# Patient Record
Sex: MALE | Race: WHITE | Employment: FULL TIME | URBAN - METROPOLITAN AREA
[De-identification: names, ages, dates, MRNs, and addresses within clinical notes are randomized per-mention and may not be internally consistent; named-entity substitution may affect disease eponyms.]

---

## 2020-11-24 ENCOUNTER — OFFICE VISIT (OUTPATIENT)
Dept: PRIMARY CARE CLINIC | Age: 30
End: 2020-11-24
Payer: COMMERCIAL

## 2020-11-24 VITALS — TEMPERATURE: 97.1 F | HEART RATE: 78 BPM | OXYGEN SATURATION: 98 %

## 2020-11-24 DIAGNOSIS — Z20.822 EXPOSURE TO 2019 NOVEL CORONAVIRUS: ICD-10-CM

## 2020-11-24 DIAGNOSIS — Z13.6 SCREENING FOR CARDIOVASCULAR, RESPIRATORY, AND GENITOURINARY DISEASES: Primary | ICD-10-CM

## 2020-11-24 DIAGNOSIS — Z13.83 SCREENING FOR CARDIOVASCULAR, RESPIRATORY, AND GENITOURINARY DISEASES: Primary | ICD-10-CM

## 2020-11-24 DIAGNOSIS — Z13.89 SCREENING FOR CARDIOVASCULAR, RESPIRATORY, AND GENITOURINARY DISEASES: Primary | ICD-10-CM

## 2020-11-24 PROCEDURE — 99202 OFFICE O/P NEW SF 15 MIN: CPT | Performed by: NURSE PRACTITIONER

## 2020-11-24 NOTE — PROGRESS NOTES
Donna Alvarez is a 31 y.o. male who was seen in clinic today (11/24/2020) for an acute visit. Assessment/Plan:            * COVID-19 sample collected and submitted       * Patient given detailed CDC instructions contained within After Visit Summary    Diagnoses and all orders for this visit:    1. Screening for cardiovascular, respiratory, and genitourinary diseases  -     NOVEL CORONAVIRUS (COVID-19)    2. Exposure to 2019 novel coronavirus       known covid exposure. Discussed expected course/resolution/complications of diagnosis in detail with patient. Advised pt on CDC guidance, OTC medications for symptom management, red flags reviewed and should develop to seek emergency medical attn. Reviewed with him that COVID-19 pandemic is an evolving situation with rapidly changing recommendations & guidelines, continue to practice hand hygiene, social distancing, wearing of facial coverings. Regardless of testing results, should still follow CDC guidelines as there is a chance of a false negative, such as a poor sample collection or being too soon to test after exposure. Medical decisions are made based on the the best information available at the time. Recommended he stay tuned for updates published by trusted sources and to advise your PCP of any unexpected changes in clinical condition     Subjective:   Shania Becker was seen today for Concern For COVID-19 (Coronavirus)  Exposure occurred approx last week, works for a [employer] with individual tested pos covid 19, also lives/works with his brother. Currently asymptomatic, no previous positive covid 19 tests. He denies a recent history/current: loss of smell/taste, cough, fever, wheezing, SOB, and BRICE. Non user of tob. Travel Screening     Question   Response    In the last month, have you been in contact with someone who was confirmed or suspected to have Coronavirus / COVID-19? Have you had a COVID-19 viral test in the last 14 days? Do you have any of the following new or worsening symptoms? None of these    Have you traveled internationally in the last month? No      Travel History   Travel since 10/24/20     No documented travel since 10/24/20          ROS - Pertinent items are noted in HPI    There is no problem list on file for this patient. Home Medications    Not on File      No Known Allergies       Objective:   Physical Exam  General:   alert, cooperative, no distress   Ears: Normal external ear canals AU           Neck: supple, symmetrical, trachea midline. Heart: normal rate, regular rhythm, normal S1, S2, no murmurs, rubs, clicks or gallops. Lungs: clear to auscultation bilaterally       Visit Vitals  Pulse 78   Temp 97.1 °F (36.2 °C)   SpO2 98%         Disclaimer:        Medication risks/benefits/costs/interactions/alternatives discussed with patient. He was given an after visit summary which includes diagnoses, current medications, & vitals. He expressed understanding with the diagnosis and plan. Aspects of this note may have been generated using voice recognition software. Despite editing, there may be some syntax errors.        Diana De La O NP

## 2020-11-26 LAB — SARS-COV-2, NAA: NOT DETECTED

## 2020-11-27 ENCOUNTER — TELEPHONE (OUTPATIENT)
Dept: PRIMARY CARE CLINIC | Age: 30
End: 2020-11-27

## 2020-12-04 ENCOUNTER — TELEPHONE (OUTPATIENT)
Dept: PRIMARY CARE CLINIC | Age: 30
End: 2020-12-04

## 2022-10-16 ENCOUNTER — HOSPITAL ENCOUNTER (EMERGENCY)
Age: 32
Discharge: HOME | End: 2022-10-16
Attending: EMERGENCY MEDICINE
Payer: COMMERCIAL

## 2022-10-16 VITALS
TEMPERATURE: 98.1 F | RESPIRATION RATE: 14 BRPM | SYSTOLIC BLOOD PRESSURE: 159 MMHG | OXYGEN SATURATION: 98 % | BODY MASS INDEX: 19.21 KG/M2 | HEIGHT: 68 IN | DIASTOLIC BLOOD PRESSURE: 74 MMHG | WEIGHT: 126.76 LBS | HEART RATE: 81 BPM

## 2022-10-16 DIAGNOSIS — L03.012 CELLULITIS OF LEFT INDEX FINGER: Primary | ICD-10-CM

## 2022-10-16 PROCEDURE — 99283 EMERGENCY DEPT VISIT LOW MDM: CPT

## 2022-10-16 RX ORDER — DOXYCYCLINE 100 MG/1
100 TABLET ORAL 2 TIMES DAILY
Qty: 14 TABLET | Refills: 0 | Status: SHIPPED | OUTPATIENT
Start: 2022-10-16 | End: 2022-10-23

## 2022-10-16 NOTE — ED NOTES
The patient was discharged home in stable condition. The patient is alert and oriented, in no respiratory distress. The patient's diagnosis, condition and treatment were explained. The patient expressed understanding. One prescription escribed. No work/school note given. A discharge plan has been developed. A  was not involved in the process. Aftercare instructions were given. Pt ambulatory out of the ED.

## 2022-10-16 NOTE — ED TRIAGE NOTES
Pt presents with left index finger injury. Initial cut to finger 1 1/2 weeks ago on a door slide in a tray. Since this past week area became swollen and red and pt took a razor blade and opened the wound; minimal drainage. Pain now and swelling continues.

## 2022-10-16 NOTE — ED PROVIDER NOTES
53-year-old male without any pertinent medical history presents to the emergency department with a chief complaint of left index finger pain. He injured it at the PIP about 10 days ago. The area was swollen and tender. Several days ago he lanced it with a razor blade. This brought some relief with some purulent discharge. Since then he has continued pain and swelling. No fevers. The history is provided by the patient and medical records. Skin Problem   This is a new problem. The current episode started more than 1 week ago. There has been no fever. Finger Swelling        History reviewed. No pertinent past medical history. History reviewed. No pertinent surgical history. History reviewed. No pertinent family history. Social History     Socioeconomic History    Marital status: SINGLE     Spouse name: Not on file    Number of children: Not on file    Years of education: Not on file    Highest education level: Not on file   Occupational History    Not on file   Tobacco Use    Smoking status: Every Day    Smokeless tobacco: Never   Substance and Sexual Activity    Alcohol use: Not on file    Drug use: Not on file    Sexual activity: Not on file   Other Topics Concern    Not on file   Social History Narrative    Not on file     Social Determinants of Health     Financial Resource Strain: Not on file   Food Insecurity: Not on file   Transportation Needs: Not on file   Physical Activity: Not on file   Stress: Not on file   Social Connections: Not on file   Intimate Partner Violence: Not on file   Housing Stability: Not on file         ALLERGIES: Patient has no known allergies. Review of Systems   Constitutional:  Negative for fatigue and fever. HENT:  Negative for sneezing and sore throat. Respiratory:  Negative for cough and shortness of breath. Cardiovascular:  Negative for chest pain and leg swelling. Gastrointestinal:  Negative for abdominal pain, diarrhea, nausea and vomiting. Genitourinary:  Negative for difficulty urinating and dysuria. Musculoskeletal:  Negative for arthralgias and myalgias. Skin:  Negative for color change and rash. Neurological:  Negative for weakness and headaches. Psychiatric/Behavioral:  Negative for agitation and behavioral problems. Vitals:    10/16/22 1320   BP: (!) 159/74   Pulse: 81   Resp: 14   Temp: 98.1 °F (36.7 °C)   SpO2: 98%   Weight: 57.5 kg (126 lb 12.2 oz)   Height: 5' 8\" (1.727 m)            Physical Exam  Vitals and nursing note reviewed. Constitutional:       General: He is not in acute distress. Appearance: Normal appearance. He is well-developed. He is not ill-appearing, toxic-appearing or diaphoretic. HENT:      Head: Normocephalic and atraumatic. Nose: Nose normal.      Mouth/Throat:      Mouth: Mucous membranes are moist.      Pharynx: Oropharynx is clear. Eyes:      Extraocular Movements: Extraocular movements intact. Conjunctiva/sclera: Conjunctivae normal.      Pupils: Pupils are equal, round, and reactive to light. Cardiovascular:      Rate and Rhythm: Normal rate and regular rhythm. Pulses: Normal pulses. Heart sounds: No murmur heard. Pulmonary:      Effort: Pulmonary effort is normal. No respiratory distress. Breath sounds: Normal breath sounds. No wheezing. Chest:      Chest wall: No mass or tenderness. Abdominal:      General: There is no distension. Palpations: Abdomen is soft. Tenderness: There is no abdominal tenderness. There is no guarding or rebound. Musculoskeletal:         General: Swelling and tenderness present. No deformity or signs of injury. Normal range of motion. Cervical back: Normal range of motion and neck supple. No rigidity. No muscular tenderness. Right lower leg: No tenderness. No edema. Left lower leg: No tenderness. No edema.       Comments: Diffuse swelling and tenderness about the left index finger PIP joint with some mild overlying erythematous changes. There is no increased pain with active or passive range of motion   Skin:     General: Skin is warm and dry. Capillary Refill: Capillary refill takes less than 2 seconds. Neurological:      General: No focal deficit present. Mental Status: He is alert and oriented to person, place, and time. Psychiatric:         Mood and Affect: Mood normal.         Behavior: Behavior normal.        MDM  Number of Diagnoses or Management Options  Cellulitis of left index finger  Diagnosis management comments: 28-year-old male presents as above with concern for cellulitis of the left index finger. Bedside ultrasound did not demonstrate any large fluid collections. Will start antibiotics, follow-up with primary care, return if needed. Bedside US    Date/Time: 10/16/2022 1:54 PM  Performed by: Betty Morris MD  Authorized by: Betty Morris MD     Verbal consent obtained: Yes    Given by:  Patient  Performed by: Attending  Type of procedure: Focused soft tissue  Left leg:      Indications:  Swelling and pain    Skin and subcutaneous tissue:  Adequate    Cobblestoning:  Normal    Subcutaneous Collection:  Absent    Interpretation:  No sonographic evidence of soft tissue abnormality

## 2024-06-22 ENCOUNTER — HOSPITAL ENCOUNTER (EMERGENCY)
Facility: HOSPITAL | Age: 34
Discharge: HOME | End: 2024-06-22
Attending: STUDENT IN AN ORGANIZED HEALTH CARE EDUCATION/TRAINING PROGRAM
Payer: COMMERCIAL

## 2024-06-22 VITALS
OXYGEN SATURATION: 99 % | DIASTOLIC BLOOD PRESSURE: 69 MMHG | RESPIRATION RATE: 18 BRPM | WEIGHT: 125 LBS | BODY MASS INDEX: 18.94 KG/M2 | SYSTOLIC BLOOD PRESSURE: 100 MMHG | HEIGHT: 68 IN | HEART RATE: 79 BPM | TEMPERATURE: 98.2 F
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
## 2024-06-22 DIAGNOSIS — L03.011 CELLULITIS OF FINGER OF RIGHT HAND: ICD-10-CM

## 2024-06-22 PROCEDURE — 90675 RABIES VACCINE IM: CPT | Performed by: STUDENT IN AN ORGANIZED HEALTH CARE EDUCATION/TRAINING PROGRAM

## 2024-06-22 PROCEDURE — 90375 RABIES IG IM/SC: CPT | Performed by: STUDENT IN AN ORGANIZED HEALTH CARE EDUCATION/TRAINING PROGRAM

## 2024-06-22 PROCEDURE — 99284 EMERGENCY DEPT VISIT MOD MDM: CPT

## 2024-06-22 PROCEDURE — 96372 THER/PROPH/DIAG INJ SC/IM: CPT

## 2024-06-22 PROCEDURE — 6360000002 HC RX W HCPCS: Performed by: STUDENT IN AN ORGANIZED HEALTH CARE EDUCATION/TRAINING PROGRAM

## 2024-06-22 PROCEDURE — 90471 IMMUNIZATION ADMIN: CPT | Performed by: STUDENT IN AN ORGANIZED HEALTH CARE EDUCATION/TRAINING PROGRAM

## 2024-06-22 RX ORDER — AMOXICILLIN AND CLAVULANATE POTASSIUM 875; 125 MG/1; MG/1
1 TABLET, FILM COATED ORAL 2 TIMES DAILY
Qty: 14 TABLET | Refills: 0 | Status: SHIPPED | OUTPATIENT
Start: 2024-06-22 | End: 2024-06-29

## 2024-06-22 RX ADMIN — RABIES IMMUNE GLOBULIN (HUMAN) 1140 UNITS: 300 INJECTION, SOLUTION INFILTRATION; INTRAMUSCULAR at 14:15

## 2024-06-22 RX ADMIN — RABIES VACCINE 1 ML: KIT at 14:17

## 2024-06-22 ASSESSMENT — PAIN - FUNCTIONAL ASSESSMENT: PAIN_FUNCTIONAL_ASSESSMENT: NONE - DENIES PAIN

## 2024-06-22 NOTE — ED TRIAGE NOTES
GCS 15. Patient ambulatory to treatment area. Patient states that he was scratched by a feral cat 6 days ago. Patient states that he received a phone call from [facility] yesterday stating the cat tested positive for rabies.

## 2024-06-22 NOTE — ED PROVIDER NOTES
[facility] EMERGENCY DEPT  EMERGENCY DEPARTMENT ENCOUNTER      Pt Name: Gavin Montiel  MRN: [id number]  Birthdate 1990  Date of evaluation: 6/22/2024  Provider: Gillian James MD    CHIEF COMPLAINT       Chief Complaint   Patient presents with    Cat Scratch         HISTORY OF PRESENT ILLNESS   (Location/Symptom, Timing/Onset, Context/Setting, Quality, Duration, Modifying Factors, Severity)  Note limiting factors.   The history is provided by the patient.     33-year-old male with no reported past medical history presenting for evaluation of cat scratch.  Reports that he was scratched by a feral cat 6 days ago, was called from the [facility] yesterday that the cat has tested positive for rabies.  Patient reports he has had some redness and drainage today from the wound, was not on antibiotics after the scratch and is concerned for infection.  Reports no other symptoms at this time, has had no fevers or chills, is otherwise in his normal state of health feeling well.    Review of External Medical Records:         Nursing Notes were reviewed.      REVIEW OF SYSTEMS    (2-9 systems for level 4, 10 or more for level 5)     Except as noted above the remainder of the review of systems was reviewed and negative.       PAST MEDICAL HISTORY   No past medical history on file.      SURGICAL HISTORY     No past surgical history on file.      CURRENT MEDICATIONS       Discharge Medication List as of 6/22/2024  2:56 PM          ALLERGIES     Patient has no known allergies.    FAMILY HISTORY     No family history on file.       SOCIAL HISTORY       Social History     Socioeconomic History    Marital status: Single   Tobacco Use    Smoking status: Every Day    Smokeless tobacco: Never           PHYSICAL EXAM    (up to 7 for level 4, 8 or more for level 5)     ED Triage Vitals [06/22/24 1322]   BP Temp Temp Source Pulse Respirations SpO2 Height Weight - Scale   116/71 98.2 °F (36.8 °C) Oral 79 18 98 % 1.727 m

## 2024-06-22 NOTE — DISCHARGE INSTRUCTIONS
You are seen today for evaluation of rabies vaccination.  You received the rabies immunoglobulin and the rabies vaccine.  You will need to follow-up to receive the remainder of your rabies vaccine on day 3 (6/25), 7 (6/29), 14 (7/6).   You have been given antibiotics for treatment of cellulitis.  Please monitor for worsening redness, swelling, armpit swelling, finger stiffness and pain or drainage which would warrant reevaluation in the emergency department.    Thank you for letting us take care of you, hope you feel better soon,  Gillian James MD

## 2024-06-25 ENCOUNTER — HOSPITAL ENCOUNTER (EMERGENCY)
Facility: HOSPITAL | Age: 34
Discharge: HOME | End: 2024-06-25
Attending: EMERGENCY MEDICINE
Payer: COMMERCIAL

## 2024-06-25 VITALS
DIASTOLIC BLOOD PRESSURE: 83 MMHG | TEMPERATURE: 98.5 F | WEIGHT: 125 LBS | RESPIRATION RATE: 20 BRPM | SYSTOLIC BLOOD PRESSURE: 124 MMHG | OXYGEN SATURATION: 99 % | BODY MASS INDEX: 19.62 KG/M2 | HEART RATE: 72 BPM | HEIGHT: 67 IN

## 2024-06-25 DIAGNOSIS — Z23 NEED FOR RABIES VACCINATION: Primary | ICD-10-CM

## 2024-06-25 PROCEDURE — 90675 RABIES VACCINE IM: CPT | Performed by: PHYSICIAN ASSISTANT

## 2024-06-25 PROCEDURE — 99284 EMERGENCY DEPT VISIT MOD MDM: CPT

## 2024-06-25 PROCEDURE — 6360000002 HC RX W HCPCS: Performed by: PHYSICIAN ASSISTANT

## 2024-06-25 PROCEDURE — 90471 IMMUNIZATION ADMIN: CPT | Performed by: PHYSICIAN ASSISTANT

## 2024-06-25 RX ADMIN — RABIES VACCINE 1 ML: KIT at 16:04

## 2024-06-25 ASSESSMENT — PAIN - FUNCTIONAL ASSESSMENT: PAIN_FUNCTIONAL_ASSESSMENT: NONE - DENIES PAIN

## 2024-06-25 NOTE — CARE COORDINATION
06/25/24  5:06 PM    CM received call from [facility] RN informing CM that pt had showed up back in the ED for second rabies vaccine as was not scheduled through [facility]/infusion center. CM reached out to pt to explain that he would need to go to infusion center, and last two vaccines were due on Saturday 6/29/24 and Saturday 7/6/24. Pt in agreement to go to [facility] [address] at [facility] as this is the campus open on the weekends.CM sent fax with information to [facility], called [facility] and scheduled last two vaccines for 6/29 at 11am and 7/6 at 10am. CM called back to pt and gave location of the clinic, date, and times of the last two vaccines. Pt was instructed to bring provider prescription. No other CM needs noted at this time.    Nithya Goncalves MA, BSW, ACM-[facility] [place]      Available via [employer]   Ketoconazole Pregnancy And Lactation Text: This medication is Pregnancy Category C and it isn't know if it is safe during pregnancy. It is also excreted in breast milk and breast feeding isn't recommended.

## 2024-06-25 NOTE — ED PROVIDER NOTES
EMERGENCY DEPARTMENT PHYSICIAN NOTE     Patient: Gavin Montiel     Time of Service: 6/25/2024  3:37 PM     Chief complaint:   Chief Complaint   Patient presents with    rabies vacination        HISTORY:  Patient is a 33 y.o. male who presents to the emergency department with complaints of second rabies vaccination.  Patient was exposed to a cat that tested positive for rabies.  States he was scratched on the finger.  Patient received his first dose of rabies and immunoglobulin 3 days ago with no complications.  States he has taken Augmentin without any issues.  No pain to the area of the scratch.  Full range of motion to the finger.  Denies fever or chills.      No past medical history on file.     No past surgical history on file.     No family history on file.     Social History     Socioeconomic History    Marital status: Single   Tobacco Use    Smoking status: Every Day    Smokeless tobacco: Never        Current Medications: Reviewed in chart.    Allergies: No Known Allergies       REVIEW OF SYSTEMS: See HPI for pertinent positives and negatives.      PHYSICAL EXAM:  /83   Pulse 72   Temp 98.5 °F (36.9 °C) (Oral)   Resp 20   Ht 1.702 m (5' 7\")   Wt 56.7 kg (125 lb)   SpO2 99%   BMI 19.58 kg/m²    Physical Exam  Vitals and nursing note reviewed.   Constitutional:       General: He is not in acute distress.  HENT:      Head: Normocephalic and atraumatic.   Eyes:      Pupils: Pupils are equal, round, and reactive to light.   Cardiovascular:      Rate and Rhythm: Normal rate and regular rhythm.   Pulmonary:      Effort: Pulmonary effort is normal.   Abdominal:      General: Abdomen is flat. There is no distension.   Musculoskeletal:         General: No tenderness. Normal range of motion.      Cervical back: Normal range of motion.   Skin:     General: Skin is warm and dry.      Comments: Superficial abrasion to right second digit with no edema or erythema.  No drainage.  Full range of motion.

## 2024-06-26 PROBLEM — Z20.3 RABIES EXPOSURE: Status: ACTIVE | Noted: 2024-06-26

## 2024-06-29 ENCOUNTER — HOSPITAL ENCOUNTER (OUTPATIENT)
Facility: HOSPITAL | Age: 34
Setting detail: INFUSION SERIES
Discharge: HOME | End: 2024-06-29
Payer: COMMERCIAL

## 2024-06-29 VITALS
OXYGEN SATURATION: 98 % | HEART RATE: 65 BPM | RESPIRATION RATE: 18 BRPM | SYSTOLIC BLOOD PRESSURE: 126 MMHG | DIASTOLIC BLOOD PRESSURE: 79 MMHG | TEMPERATURE: 97.2 F

## 2024-06-29 DIAGNOSIS — Z20.3 RABIES EXPOSURE: Primary | ICD-10-CM

## 2024-06-29 PROCEDURE — 90675 RABIES VACCINE IM: CPT | Performed by: STUDENT IN AN ORGANIZED HEALTH CARE EDUCATION/TRAINING PROGRAM

## 2024-06-29 PROCEDURE — 6360000002 HC RX W HCPCS: Performed by: STUDENT IN AN ORGANIZED HEALTH CARE EDUCATION/TRAINING PROGRAM

## 2024-06-29 PROCEDURE — 96372 THER/PROPH/DIAG INJ SC/IM: CPT

## 2024-06-29 PROCEDURE — 90471 IMMUNIZATION ADMIN: CPT | Performed by: STUDENT IN AN ORGANIZED HEALTH CARE EDUCATION/TRAINING PROGRAM

## 2024-06-29 RX ADMIN — RABIES VACCINE 1 ML: KIT at 10:54

## 2024-06-29 NOTE — PROGRESS NOTES
[facility] Progress Note    Date: June 29, 2024         Pt arrived ambulatory to [facility] for Rabies vaccine day 3 in stable condition.  Assessment completed. .        Patient Vitals for the past 12 hrs:   Temp Pulse Resp BP SpO2   06/29/24 1045 97.2 °F (36.2 °C) 65 18 126/79 98 %         Medications Administered         rabies vaccine, PCEC (RABAVERT) injection 1 mL Admin Date  06/29/2024 Action  Given Dose  1 mL Route  IntraMUSCular Administered By  Audra Mcfarland, RN           Injection given in left arm.    Mr. Montiel tolerated the injection, and had no complaints.    Mr. Montiel was discharged from [facility] in stable condition. Patient is aware of next scheduled [facility] appointment.         Future Appointments   Date Time Provider Department Center   7/6/2024 10:00 [facility]         Audra Mcfarland, RN, RN  June 29, 2024

## 2024-07-06 ENCOUNTER — HOSPITAL ENCOUNTER (OUTPATIENT)
Facility: HOSPITAL | Age: 34
Setting detail: INFUSION SERIES
Discharge: HOME | End: 2024-07-06
Payer: COMMERCIAL

## 2024-07-06 VITALS
HEART RATE: 69 BPM | RESPIRATION RATE: 16 BRPM | SYSTOLIC BLOOD PRESSURE: 129 MMHG | OXYGEN SATURATION: 97 % | DIASTOLIC BLOOD PRESSURE: 78 MMHG | TEMPERATURE: 97.9 F

## 2024-07-06 DIAGNOSIS — Z20.3 RABIES EXPOSURE: Primary | ICD-10-CM

## 2024-07-06 PROCEDURE — 90471 IMMUNIZATION ADMIN: CPT | Performed by: STUDENT IN AN ORGANIZED HEALTH CARE EDUCATION/TRAINING PROGRAM

## 2024-07-06 PROCEDURE — 6360000002 HC RX W HCPCS: Performed by: STUDENT IN AN ORGANIZED HEALTH CARE EDUCATION/TRAINING PROGRAM

## 2024-07-06 PROCEDURE — 90675 RABIES VACCINE IM: CPT | Performed by: STUDENT IN AN ORGANIZED HEALTH CARE EDUCATION/TRAINING PROGRAM

## 2024-07-06 RX ADMIN — RABIES VACCINE 1 ML: KIT at 11:06

## 2024-07-06 NOTE — PROGRESS NOTES
[facility] Visit Note    Pt arrived to [facility] ambulatory in no acute distress at Rabies vaccination. Assessment unchanged, patient has no complaints or concerns at this time.     /78   Pulse 69   Temp 97.9 °F (36.6 °C) (Temporal)   Resp 16   SpO2 97%     No results found for this or any previous visit (from the past 12 hour(s)).      The following medications administered:  Medications Administered         rabies vaccine, PCEC (RABAVERT) injection 1 mL Admin Date  07/06/2024 Action  Given Dose  1 mL Route  IntraMUSCular Administered By  Letty Nava, RN            Pt tolerated treatment well. Vaccination given in right deltoid IM. No other appointments scheduled at this time.